# Patient Record
Sex: MALE | Race: BLACK OR AFRICAN AMERICAN | NOT HISPANIC OR LATINO | Employment: FULL TIME | URBAN - METROPOLITAN AREA
[De-identification: names, ages, dates, MRNs, and addresses within clinical notes are randomized per-mention and may not be internally consistent; named-entity substitution may affect disease eponyms.]

---

## 2018-10-25 ENCOUNTER — HOSPITAL ENCOUNTER (EMERGENCY)
Facility: MEDICAL CENTER | Age: 23
End: 2018-10-25
Attending: EMERGENCY MEDICINE

## 2018-10-25 VITALS
HEIGHT: 69 IN | SYSTOLIC BLOOD PRESSURE: 133 MMHG | HEART RATE: 70 BPM | DIASTOLIC BLOOD PRESSURE: 68 MMHG | BODY MASS INDEX: 30.47 KG/M2 | OXYGEN SATURATION: 97 % | RESPIRATION RATE: 16 BRPM | WEIGHT: 205.69 LBS | TEMPERATURE: 98.1 F

## 2018-10-25 DIAGNOSIS — K08.89 PAIN, DENTAL: ICD-10-CM

## 2018-10-25 DIAGNOSIS — K08.89 DENTALGIA: ICD-10-CM

## 2018-10-25 PROCEDURE — 700102 HCHG RX REV CODE 250 W/ 637 OVERRIDE(OP): Performed by: EMERGENCY MEDICINE

## 2018-10-25 PROCEDURE — A9270 NON-COVERED ITEM OR SERVICE: HCPCS | Performed by: EMERGENCY MEDICINE

## 2018-10-25 PROCEDURE — 99284 EMERGENCY DEPT VISIT MOD MDM: CPT

## 2018-10-25 RX ORDER — KETOROLAC TROMETHAMINE 30 MG/ML
30 INJECTION, SOLUTION INTRAMUSCULAR; INTRAVENOUS ONCE
Status: DISCONTINUED | OUTPATIENT
Start: 2018-10-25 | End: 2018-10-26 | Stop reason: HOSPADM

## 2018-10-25 RX ORDER — NAPROXEN 500 MG/1
500 TABLET ORAL 2 TIMES DAILY WITH MEALS
Qty: 30 TAB | Refills: 0 | Status: SHIPPED | OUTPATIENT
Start: 2018-10-25

## 2018-10-25 RX ORDER — CLINDAMYCIN HYDROCHLORIDE 300 MG/1
300 CAPSULE ORAL 3 TIMES DAILY
Qty: 30 CAP | Refills: 0 | Status: SHIPPED | OUTPATIENT
Start: 2018-10-25 | End: 2018-11-04

## 2018-10-25 RX ORDER — CLINDAMYCIN HYDROCHLORIDE 150 MG/1
300 CAPSULE ORAL ONCE
Status: COMPLETED | OUTPATIENT
Start: 2018-10-25 | End: 2018-10-25

## 2018-10-25 RX ADMIN — CLINDAMYCIN HYDROCHLORIDE 300 MG: 150 CAPSULE ORAL at 22:56

## 2018-10-25 ASSESSMENT — ENCOUNTER SYMPTOMS
CHILLS: 0
FEVER: 0

## 2018-10-25 ASSESSMENT — PAIN SCALES - GENERAL: PAINLEVEL_OUTOF10: 10

## 2018-10-26 ASSESSMENT — ENCOUNTER SYMPTOMS
NECK PAIN: 0
SORE THROAT: 0
BLURRED VISION: 0

## 2018-10-26 NOTE — ED PROVIDER NOTES
ED Provider Note    Scribed for Monique Byrnes M.D. by Mendoza Queen. 10/25/2018, 10:32 PM.    Primary care provider: None noted  Means of arrival: Walk in  History obtained from: Patient  History limited by: None    CHIEF COMPLAINT  Chief Complaint   Patient presents with   • Dental Pain     1 1/2 day       HPI  James Bhat is a 23 y.o. male who presents to the Emergency Department complaining of pain to his right lower jaw area which started two days ago. Patient initially thought this was due to being sick, prompting him to take Nyquil. However, he woke up with worsened pain the following morning.  He states the pain starts in his right lower teeth and radiates to his face.  He feels as if his right face is swollen but has no obvious swelling.  He describes the pain is mild in severity.  He otherwise does not report any other associated symptoms at this time.  He denies fevers, chills, dysphagia, difficulty swallowing.    REVIEW OF SYSTEMS  Review of Systems   Constitutional: Negative for chills and fever.   HENT: Negative for sore throat.         Positive for dental pain   Eyes: Negative for blurred vision.   Cardiovascular: Negative for chest pain.   Musculoskeletal: Negative for neck pain.   All other systems reviewed and are negative.      PAST MEDICAL HISTORY   has a past medical history of STI (sexually transmitted infection).    SURGICAL HISTORY  patient denies any surgical history    SOCIAL HISTORY  Social History   Substance Use Topics   • Smoking status: Never Smoker   • Smokeless tobacco: Never Used   • Alcohol use No      History   Drug Use   • Types: Inhaled     Comment: marijuana        FAMILY HISTORY  History reviewed. No pertinent family history.    CURRENT MEDICATIONS  Home Medications     Reviewed by Octavio Johnson R.N. (Registered Nurse) on 10/25/18 at 214  Med List Status: Complete   Medication Last Dose Status        Patient Richard Taking any Medications                  "      ALLERGIES  No Known Allergies    PHYSICAL EXAM  VITAL SIGNS: /56   Pulse 90   Temp 36.2 °C (97.1 °F)   Resp 14   Ht 1.753 m (5' 9\")   Wt 93.3 kg (205 lb 11 oz)   SpO2 96%   BMI 30.38 kg/m²   Vitals reviewed by myself.  Physical Exam  Nursing note and vitals reviewed.  Constitutional: Well-developed and well-nourished. No acute distress.   HENT: Head is normocephalic and atraumatic.  Poor dentition.  No gumline abscess is noted.  Posterior oropharynx is pink without exudates.  Uvula is midline.  Patient has full range of motion of his neck.  No obvious facial swelling or lymphadenopathy is noted.  Eyes: extra-ocular movements intact  Cardiovascular: Regular rate and  regular rhythm. No murmur heard.  Pulmonary/Chest: Breath sounds normal. No wheezes or rales.   Abdominal: Soft and non-tender. No distention.    Musculoskeletal: Extremities exhibit normal range of motion without edema or tenderness.   Neurological: Awake and alert  Skin: Skin is warm and dry. No rash.        REASSESSMENT  10:32 PM Patient seen and examined at bedside. Patient presents with right lower jaw pain. Discussed plan of care which includes starting patient on antiinflammatory medication and antibiotic medication. He will be given the first dose here. The patient will then be discharged with instructions regarding supportive care. Recommended icing the area. Informed patient he can take Tylenol but no extra Motrin with the prescribed medication. Instructions were given for follow-up with a dentist in the next couple of days. Discussed indications for seeking immediate medical attention. Patient was given the opportunity for questions. The patient understands and agrees.        COURSE & MEDICAL DECISION MAKING  Nursing notes, VS, PMSFHx reviewed in chart.    Patient is a 23-year-old male who comes in for right lower jaw pain.  Differential diagnosis includes dental caries, dental infection, gingivitis.  On physical exam " patient is well-appearing with vitals in normal limits.  I believe he likely has a dental infection given the location of his pain.  He has no posterior oropharynx swelling, no difficulty swallowing, and no dysphasia.  He has full range of motion of his neck.  Patient will be treated with clindamycin and is given first dose here in the emergency department.  His pain is treated with Toradol.  Aside from clindamycin I will prescribe him naproxen and have advised him to follow-up with dentist.  Patient is then given strict return precautions and discharged home in stable condition.     The patient will return for new or worsening symptoms and is stable at the time of discharge.    The patient is referred to a primary physician for blood pressure management, diabetic screening, and for all other preventative health concerns.      DISPOSITION:  Patient will be discharged home in stable condition.    OUTPATIENT MEDICATIONS:  New Prescriptions    CLINDAMYCIN (CLEOCIN) 300 MG CAP    Take 1 Cap by mouth 3 times a day for 10 days.    NAPROXEN (NAPROSYN) 500 MG TAB    Take 1 Tab by mouth 2 times a day, with meals.        FINAL IMPRESSION  1. Pain, dental    2. Dentalgia          Mendoza LANDIS (Mariibe), am scribing for, and in the presence of, Monique Byrnes M.D..    Electronically signed by: Mendoza Queen (Shadi), 10/25/2018    Monique LANDIS M.D. personally performed the services described in this documentation, as scribed by Mendoza Queen in my presence, and it is both accurate and complete. E    The note accurately reflects work and decisions made by me.  Monique Byrnes  10/26/2018  2:35 AM

## 2018-10-26 NOTE — ED TRIAGE NOTES
James Bhat  23 y.o.  male  Chief Complaint   Patient presents with   • Dental Pain     1 1/2 day     Present to triage c/o right lower jaw pain ( dental pain ) x 1.5 day. Pain radiates to neck and ear. Took total of 6 tabs of 500 mg tylenol the whole day with no relief.

## 2025-04-08 ENCOUNTER — HOSPITAL ENCOUNTER (EMERGENCY)
Facility: MEDICAL CENTER | Age: 30
End: 2025-04-08
Attending: EMERGENCY MEDICINE

## 2025-04-08 VITALS
WEIGHT: 239.42 LBS | DIASTOLIC BLOOD PRESSURE: 85 MMHG | OXYGEN SATURATION: 96 % | BODY MASS INDEX: 34.28 KG/M2 | SYSTOLIC BLOOD PRESSURE: 121 MMHG | TEMPERATURE: 97.7 F | HEART RATE: 71 BPM | HEIGHT: 70 IN | RESPIRATION RATE: 16 BRPM

## 2025-04-08 DIAGNOSIS — M67.911 DISORDER OF RIGHT ROTATOR CUFF: ICD-10-CM

## 2025-04-08 PROCEDURE — 99284 EMERGENCY DEPT VISIT MOD MDM: CPT

## 2025-04-08 PROCEDURE — A9270 NON-COVERED ITEM OR SERVICE: HCPCS | Performed by: EMERGENCY MEDICINE

## 2025-04-08 PROCEDURE — 700101 HCHG RX REV CODE 250: Performed by: EMERGENCY MEDICINE

## 2025-04-08 PROCEDURE — 700102 HCHG RX REV CODE 250 W/ 637 OVERRIDE(OP): Performed by: EMERGENCY MEDICINE

## 2025-04-08 RX ORDER — ACETAMINOPHEN 500 MG
1000 TABLET ORAL ONCE
Status: COMPLETED | OUTPATIENT
Start: 2025-04-08 | End: 2025-04-08

## 2025-04-08 RX ORDER — LIDOCAINE 4 G/G
1 PATCH TOPICAL EVERY 24 HOURS
Status: DISCONTINUED | OUTPATIENT
Start: 2025-04-08 | End: 2025-04-08 | Stop reason: HOSPADM

## 2025-04-08 RX ADMIN — IBUPROFEN 800 MG: 200 TABLET, FILM COATED ORAL at 18:41

## 2025-04-08 RX ADMIN — LIDOCAINE PAIN RELIEF 1 PATCH: 560 PATCH TOPICAL at 18:45

## 2025-04-08 RX ADMIN — ACETAMINOPHEN 1000 MG: 500 TABLET ORAL at 18:44

## 2025-04-08 ASSESSMENT — PAIN DESCRIPTION - PAIN TYPE: TYPE: ACUTE PAIN

## 2025-04-08 ASSESSMENT — PAIN DESCRIPTION - DESCRIPTORS: DESCRIPTORS: SHARP

## 2025-04-08 NOTE — Clinical Note
James Bhat was seen and treated in our emergency department on 4/8/2025.  He may return to work on 04/10/2025.  Patient is excused from 4/8 - 4/10 and should be in light duty until 4/16 less than 10 lbs of weight lifting until cleared by orthopedics     If you have any questions or concerns, please don't hesitate to call.      Lara Kiran M.D.

## 2025-04-08 NOTE — Clinical Note
James Bhat was seen and treated in our emergency department on 4/8/2025.  He may return to work on 04/10/2025.  Patient is excused from work 4/8 - return 4/10 and should be on light duty      If you have any questions or concerns, please don't hesitate to call.      Lara Kiran M.D.

## 2025-04-09 NOTE — ED PROVIDER NOTES
ED Provider Note    CHIEF COMPLAINT  Chief Complaint   Patient presents with    Shoulder Pain     Right shoulder pain at work that started suddenly last night. Pt reports the pain as sharp 8/10 pain worse with movement and breathing. Worse on palp.        HPI/ROS  LIMITATION TO HISTORY   Select: : None  OUTSIDE HISTORIAN(S):  esperanza Bhat is a 30 y.o. male who presents to the emergency department chief complaint of right posterior shoulder pain.  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  He states he was doing well and is actually the second job he had just taken the trash out and was an exceedingly heavy but when he went to clock out he just noticed worsening pain in his right posterior shoulder and pain with movement.  He denies shortness of breath no actual chest pain he states it hurts when he takes a deep breath but it hurts his back he does not feel short of breath at all.  He denies weakness numbness or tingling has not taken anything for pain    PAST MEDICAL HISTORY   has a past medical history of STI (sexually transmitted infection).    SURGICAL HISTORY  patient denies any surgical history    FAMILY HISTORY  History reviewed. No pertinent family history.    SOCIAL HISTORY  Social History     Tobacco Use    Smoking status: Never    Smokeless tobacco: Never   Substance and Sexual Activity    Alcohol use: No    Drug use: Yes     Types: Inhaled     Comment: marijuana daily    Sexual activity: Not on file       CURRENT MEDICATIONS  Home Medications       Reviewed by Rangel Foss R.N. (Registered Nurse) on 04/08/25 at 1720  Med List Status: Not Addressed     Medication Last Dose Status   naproxen (NAPROSYN) 500 MG Tab  Active                    ALLERGIES  Allergies   Allergen Reactions    Peg-8 Beeswax [Polyethylene Glycol]        PHYSICAL EXAM  VITAL SIGNS: BP (!) 140/107   Pulse 83   Temp 36.4 °C (97.5 °F) (Temporal)    "Resp 16   Ht 1.778 m (5' 10\")   Wt 109 kg (239 lb 6.7 oz)   SpO2 97%   BMI 34.35 kg/m²    Pulse OX: Pulse Oxygen level is within normal limits on room air  Constitutional: Alert in no apparent distress.  Eyes: PERound. Conjunctiva normal, non-icteric.   EXT/ palpation over the infraspinatus of the right upper back near the scapula and on top of the scapula and all the way to the insertion point in the posterior aspect of the rotator cuff.  Significant tenderness over the insertion point.  No erythema warmth or induration full range of motion with passive range of motion all limited with active secondary to pain radial pulse 2+ sensation intact light touch distally  Skin: Warm, Dry, No erythema, No rash.   Neurologic: Alert and oriented, Grossly non-focal.       COURSE & MEDICAL DECISION MAKING    ASSESSMENT, COURSE AND PLAN/ DISPOSITION AND DISCUSSIONS  Care Narrative:     Patient is a 30-year-old male who presents to the emergency department with likely rotator cuff injury.  He has not done anything for his pain at this time we discussed NSAIDs ice packs rest ice follow-up with orthopedics.  He will be given a lidocaine patch ibuprofen Tylenol and a light work note for the next week or so.  Importance of following up with orthopedics and return to the ED for any new worsening issues    I have discussed management of the patient with the following physicians and JAMAAL's:  none    Discussion of management with other QHP or appropriate source(s): None     Escalation of care considered, and ultimately not performed:Laboratory analysis and diagnostic imaging    Barriers to care at this time, including but not limited to: Patient does not have established PCP.     Decision tools and prescription drugs considered including, but not limited to: Pain Medications nsaids .    The patient will return for new or worsening symptoms and is stable at the time of discharge.    The patient is referred to a primary " physician for blood pressure management, diabetic screening, and for all other preventative health concerns.    DISPOSITION:  Patient will be discharged home in stable condition.    FOLLOW UP:  [facility], Emergency Dept  [address] [facility]  [place] [id number] [phone]    If symptoms worsen    Lefty Vasques M.D.  [address] [place] [id number] [phone]    Schedule an appointment as soon as possible for a visit         OUTPATIENT MEDICATIONS:  Discharge Medication List as of 4/8/2025  6:51 PM            FINAL DIAGNOSIS  1. Disorder of right rotator cuff         Electronically signed by: Lara Kiran M.D., 4/8/2025 6:08 PM

## 2025-04-09 NOTE — ED TRIAGE NOTES
James Bhat  30 y.o.  male  Chief Complaint   Patient presents with    Shoulder Pain     Right shoulder pain at work that started suddenly last night. Pt reports the pain as sharp 8/10 pain worse with movement and breathing. Worse on palp.      Pt placed in lobby. Pt uncomfortable in triage.